# Patient Record
Sex: MALE | Race: WHITE
[De-identification: names, ages, dates, MRNs, and addresses within clinical notes are randomized per-mention and may not be internally consistent; named-entity substitution may affect disease eponyms.]

---

## 2018-09-13 ENCOUNTER — HOSPITAL ENCOUNTER (OUTPATIENT)
Dept: HOSPITAL 72 - RAD | Age: 63
Discharge: HOME | End: 2018-09-13
Payer: MEDICARE

## 2018-09-13 DIAGNOSIS — M81.0: ICD-10-CM

## 2018-09-13 DIAGNOSIS — M25.562: Primary | ICD-10-CM

## 2018-09-13 NOTE — DIAGNOSTIC IMAGING REPORT
Indication:

 

Technique: 3 views of the left knee

 

Comparison: None

 

Findings: There are superior and inferior pole patellar osteophytes. The medial and

lateral joint spaces are preserved The bones are osteoporotic. No definite acute

fractures. No dislocations. There are vascular calcifications

 

Impression: Osteoporosis

 

Degenerative changes, as described

 

No acute bony trauma